# Patient Record
Sex: MALE | Race: WHITE
[De-identification: names, ages, dates, MRNs, and addresses within clinical notes are randomized per-mention and may not be internally consistent; named-entity substitution may affect disease eponyms.]

---

## 2020-05-29 ENCOUNTER — HOSPITAL ENCOUNTER (EMERGENCY)
Dept: HOSPITAL 53 - M ED | Age: 80
Discharge: ANOTHER HOSPITAL | End: 2020-05-29
Payer: MEDICARE

## 2020-05-29 VITALS — SYSTOLIC BLOOD PRESSURE: 145 MMHG | DIASTOLIC BLOOD PRESSURE: 87 MMHG

## 2020-05-29 VITALS — BODY MASS INDEX: 23.46 KG/M2 | HEIGHT: 67 IN | WEIGHT: 149.47 LBS

## 2020-05-29 DIAGNOSIS — I48.91: ICD-10-CM

## 2020-05-29 DIAGNOSIS — Z79.890: ICD-10-CM

## 2020-05-29 DIAGNOSIS — F17.210: ICD-10-CM

## 2020-05-29 DIAGNOSIS — Z79.01: ICD-10-CM

## 2020-05-29 DIAGNOSIS — Z79.899: ICD-10-CM

## 2020-05-29 DIAGNOSIS — I10: ICD-10-CM

## 2020-05-29 DIAGNOSIS — E07.9: ICD-10-CM

## 2020-05-29 DIAGNOSIS — I63.9: Primary | ICD-10-CM

## 2020-05-29 LAB
APTT BLD: 31.5 SECONDS (ref 25–38.4)
BASOPHILS # BLD AUTO: 0.1 10^3/UL (ref 0–0.2)
BASOPHILS NFR BLD AUTO: 0.9 % (ref 0–1)
CK MB CFR.DF SERPL CALC: 2.38
CK MB SERPL-MCNC: 3.4 NG/ML (ref ?–3.6)
CK SERPL-CCNC: 143 U/L (ref 39–308)
EOSINOPHIL # BLD AUTO: 0.1 10^3/UL (ref 0–0.5)
EOSINOPHIL NFR BLD AUTO: 2.1 % (ref 0–3)
HCT VFR BLD AUTO: 40.3 % (ref 42–52)
HGB BLD-MCNC: 13.5 G/DL (ref 13.5–17.5)
INR PPP: 1.11
LYMPHOCYTES # BLD AUTO: 0.8 10^3/UL (ref 1.5–5)
LYMPHOCYTES NFR BLD AUTO: 14 % (ref 24–44)
MCH RBC QN AUTO: 32.7 PG (ref 27–33)
MCHC RBC AUTO-ENTMCNC: 33.5 G/DL (ref 32–36.5)
MCV RBC AUTO: 97.6 FL (ref 80–96)
MONOCYTES # BLD AUTO: 0.5 10^3/UL (ref 0–0.8)
MONOCYTES NFR BLD AUTO: 8 % (ref 0–5)
NEUTROPHILS # BLD AUTO: 4.2 10^3/UL (ref 1.5–8.5)
NEUTROPHILS NFR BLD AUTO: 74.5 % (ref 36–66)
PLATELET # BLD AUTO: 142 10^3/UL (ref 150–450)
PROTHROMBIN TIME: 14 SECONDS (ref 11.8–14)
RBC # BLD AUTO: 4.13 10^6/UL (ref 4.3–6.1)
TROPONIN I SERPL-MCNC: < 0.02 NG/ML (ref ?–0.1)
WBC # BLD AUTO: 5.6 10^3/UL (ref 4–10)

## 2020-05-29 PROCEDURE — 70498 CT ANGIOGRAPHY NECK: CPT

## 2020-05-29 PROCEDURE — 85730 THROMBOPLASTIN TIME PARTIAL: CPT

## 2020-05-29 PROCEDURE — 94760 N-INVAS EAR/PLS OXIMETRY 1: CPT

## 2020-05-29 PROCEDURE — 93041 RHYTHM ECG TRACING: CPT

## 2020-05-29 PROCEDURE — 86850 RBC ANTIBODY SCREEN: CPT

## 2020-05-29 PROCEDURE — 84484 ASSAY OF TROPONIN QUANT: CPT

## 2020-05-29 PROCEDURE — 85610 PROTHROMBIN TIME: CPT

## 2020-05-29 PROCEDURE — 96365 THER/PROPH/DIAG IV INF INIT: CPT

## 2020-05-29 PROCEDURE — 80047 BASIC METABLC PNL IONIZED CA: CPT

## 2020-05-29 PROCEDURE — 93005 ELECTROCARDIOGRAM TRACING: CPT

## 2020-05-29 PROCEDURE — 82553 CREATINE MB FRACTION: CPT

## 2020-05-29 PROCEDURE — 82550 ASSAY OF CK (CPK): CPT

## 2020-05-29 PROCEDURE — 86901 BLOOD TYPING SEROLOGIC RH(D): CPT

## 2020-05-29 PROCEDURE — 70450 CT HEAD/BRAIN W/O DYE: CPT

## 2020-05-29 PROCEDURE — 86900 BLOOD TYPING SEROLOGIC ABO: CPT

## 2020-05-29 PROCEDURE — 71045 X-RAY EXAM CHEST 1 VIEW: CPT

## 2020-05-29 PROCEDURE — 70496 CT ANGIOGRAPHY HEAD: CPT

## 2020-05-29 PROCEDURE — 85025 COMPLETE CBC W/AUTO DIFF WBC: CPT

## 2020-05-29 PROCEDURE — 36415 COLL VENOUS BLD VENIPUNCTURE: CPT

## 2020-05-29 PROCEDURE — 99291 CRITICAL CARE FIRST HOUR: CPT

## 2020-05-30 NOTE — REP
NONCONTRAST BRAIN CT.

 

HISTORY:  CVA.

 

No comparison study.

 

FINDINGS:  Digital preliminary  radiograph is unremarkable.  There is some

vascular calcification in the distal internal carotid arteries bilaterally.  The

visualized paranasal sinuses are clear.  Bony calvarium appears intact.

 

On soft tissue window settings, there is minimal generalized volume loss.  There

is no evidence of intracranial hemorrhage or acute infarction.  No extra-axial

fluid collection is seen.  No mass or midline shift is observed.

 

IMPRESSION:

 

Generalized volume loss and vascular calcification.  No acute intracranial

abnormality.

 

 

Electronically Signed by

Cecil Maynard MD 05/31/2020 07:38 P

## 2020-05-30 NOTE — REP
CT ANGIOGRAPHY OF THE BRAIN WITH IV CONTRAST:

 

HISTORY:  CVA.

 

100 mL of intravenous Isovue 370 is administered.

 

CT ANGIOGRAPHIC TECHNIQUE:  Helical scanning is acquired and 2 mm axial images

are reformatted.  Coronal and sagittal MIP and MPR images are generated and

reviewed.  3D surface rendered color images of the arteries are generated as

well.

 

CT ANGIOGRAPHIC FINDINGS:  Distal vertebral arteries are patent and codominant.

Basilar artery is widely patent.  Posterior cerebral and superior cerebellar

vessels are unremarkable.  The distal internal carotid arteries contain some

calcification but no significant stenosis is seen.  Anterior and middle cerebral

arteries are unremarkable bilaterally.  No vessel cutoff is seen.  There is no CT

angiographic evidence of berry aneurysm or arteriovenous malformation.  The

sagittal and bilateral sigmoid and straight sinuses are patent and unremarkable.

 

IMPRESSION:

 

There is some vascular calcification in the carotid siphons bilaterally but no

high-grade stenosis is seen.  Otherwise negative CT angiography of the brain.

 

 

Electronically Signed by

Cecil Maynard MD 05/31/2020 07:40 P

## 2020-05-30 NOTE — REP
CHEST, SINGLE VIEW:

 

Single view of the chest is performed. Cardiac silhouette is slightly prominent,

unchanged since 10/03/2014. There is no change in the mediastinal silhouette.

Multiple sternal wires are again present. No acute infiltrate is seen in either

lung.

 

IMPRESSION:

 

No acute pulmonary disease.

 

 

Electronically Signed by

Augustin Mercer MD 05/30/2020 09:32 P

## 2020-05-30 NOTE — REP
CT ANGIOGRAPHY OF THE NECK WITH IV CONTRAST:

 

HISTORY:  Weakness.  CVA.

 

CT CONTRAST DOSE:  100 mL of intravenous Isovue 370.

 

CT ANGIOGRAPHIC TECHNIQUE:  Helical scanning is acquired and 2 mm axial images

are reformatted.  MIP and MPR images are generated and reviewed.

 

CT ANGIOGRAPHIC FINDINGS:  The thoracic aortic arch is unremarkable.  There is

calcific plaquing at the origin of the left common carotid artery from the arch,

and considerable plaquing is seen at the origin of the right common carotid

artery from the innominate.  There is also considerable plaquing at the origin of

the left vertebral artery from the left subclavian artery.  I cannot exclude some

degree of stenosis of the origin of the left vertebral and right common carotid

arteries.  The vertebral arteries are otherwise patent and codominant.  The

common carotid arteries are otherwise unremarkable.

 

On the left, there is minimal soft plaquing at the bifurcation.  Less than 50%

narrowing is seen in the origin of the left internal carotid artery.  The right

internal carotid artery is tortuous.  No significant plaquing or stenosis is seen

at its origin.

 

IMPRESSION:

 

Significant calcific plaquing is seen at the origin of the right common carotid

artery from the innominate and at the origin of the left vertebral artery from

the left subclavian.  Mild soft plaquing is seen at the carotid bifurcation on

the left but no high-grade internal carotid artery stenosis is seen.

 

 

Electronically Signed by

Cecil Maynard MD 05/31/2020 07:40 P

## 2020-05-30 NOTE — ECGEPIP
[facility] - ED

                                       

                                       Test Date:    2020

Pat Name:     ARMANDO ORTIZ          Department:   

Patient ID:   [id number]                 Room:         -

Gender:       Male                     Technician:   ef

:          1940               Requested By: DILIP REYNOSO

Order Number: [id number]     Reading MD:   Dilip Jordan

                                 Measurements

Intervals                              Axis          

Rate:         82                       P:            

NE:           0                        QRS:          42

QRSD:         106                      T:            83

QT:           399                                    

QTc:          466                                    

                           Interpretive Statements

Atrial fibrillation

LOW QRS VOLTAGE IN EXTREMITY LEADS

Nonspecific T wave abnormality

ANTEROLATERAL MYOCARDIAL INFARCTION, OF INDETERMINATE AGE

Comparison tracing not on file

Electronically Signed on 2020 0:15:44 EDT by Dilip Jordan

## 2020-06-05 ENCOUNTER — HOSPITAL ENCOUNTER (OUTPATIENT)
Dept: HOSPITAL 53 - M SFHCPLAZ | Age: 80
End: 2020-06-05
Attending: INTERNAL MEDICINE
Payer: MEDICARE

## 2020-06-05 DIAGNOSIS — E03.9: Primary | ICD-10-CM

## 2020-06-05 LAB
T4 FREE SERPL-MCNC: 1.31 NG/DL (ref 0.76–1.46)
TSH SERPL DL<=0.005 MIU/L-ACNC: 6.78 UIU/ML (ref 0.36–3.74)

## 2020-06-05 PROCEDURE — 36415 COLL VENOUS BLD VENIPUNCTURE: CPT

## 2020-06-05 PROCEDURE — 84443 ASSAY THYROID STIM HORMONE: CPT

## 2020-06-05 PROCEDURE — 84439 ASSAY OF FREE THYROXINE: CPT

## 2020-06-10 ENCOUNTER — HOSPITAL ENCOUNTER (OUTPATIENT)
Dept: HOSPITAL 53 - M RAD | Age: 80
End: 2020-06-10
Attending: STUDENT IN AN ORGANIZED HEALTH CARE EDUCATION/TRAINING PROGRAM
Payer: MEDICARE

## 2020-06-10 DIAGNOSIS — I63.9: Primary | ICD-10-CM

## 2020-06-10 NOTE — REP
CT brain:  06/10/2020.

 

Indication:  Stroke.

 

Technique:  Unenhanced axial CT images of the brain were obtained from skull base

to vertex with coronal reconstructions provided.

 

Comparison:  05/29/2020.

 

Findings:

 

There is a more conspicuous left frontal / MCA infarction without hemorrhage or

mass effect.  No acute infarction is detected.  There is no hydrocephalus.

 

Impression:

 

Involving the left MCA infarction without hemorrhage or mass effect.

 

 

Electronically Signed by

Octaviano Amor DO 06/10/2020 11:29 A

## 2020-06-12 ENCOUNTER — HOSPITAL ENCOUNTER (INPATIENT)
Dept: HOSPITAL 53 - M ED | Age: 80
LOS: 1 days | Discharge: HOME | DRG: 69 | End: 2020-06-13
Attending: INTERNAL MEDICINE | Admitting: INTERNAL MEDICINE
Payer: MEDICARE

## 2020-06-12 VITALS — DIASTOLIC BLOOD PRESSURE: 61 MMHG | SYSTOLIC BLOOD PRESSURE: 118 MMHG

## 2020-06-12 VITALS — BODY MASS INDEX: 26.74 KG/M2 | HEIGHT: 67 IN | WEIGHT: 170.35 LBS

## 2020-06-12 VITALS — DIASTOLIC BLOOD PRESSURE: 62 MMHG | SYSTOLIC BLOOD PRESSURE: 118 MMHG

## 2020-06-12 VITALS — DIASTOLIC BLOOD PRESSURE: 63 MMHG | SYSTOLIC BLOOD PRESSURE: 109 MMHG

## 2020-06-12 VITALS — SYSTOLIC BLOOD PRESSURE: 150 MMHG | DIASTOLIC BLOOD PRESSURE: 72 MMHG

## 2020-06-12 DIAGNOSIS — I48.91: ICD-10-CM

## 2020-06-12 DIAGNOSIS — G45.9: Primary | ICD-10-CM

## 2020-06-12 DIAGNOSIS — Z79.899: ICD-10-CM

## 2020-06-12 DIAGNOSIS — I25.10: ICD-10-CM

## 2020-06-12 DIAGNOSIS — E03.9: ICD-10-CM

## 2020-06-12 LAB
ALBUMIN SERPL BCG-MCNC: 3.9 GM/DL (ref 3.2–5.2)
ALT SERPL W P-5'-P-CCNC: 37 U/L (ref 12–78)
APTT BLD: 32 SECONDS (ref 25–38.4)
BASOPHILS # BLD AUTO: 0.1 10^3/UL (ref 0–0.2)
BASOPHILS NFR BLD AUTO: 0.9 % (ref 0–1)
BILIRUB CONJ SERPL-MCNC: 0.3 MG/DL (ref 0–0.2)
BILIRUB SERPL-MCNC: 0.9 MG/DL (ref 0.2–1)
BUN SERPL-MCNC: 22 MG/DL (ref 7–18)
CALCIUM SERPL-MCNC: 9.3 MG/DL (ref 8.8–10.2)
CHLORIDE SERPL-SCNC: 102 MEQ/L (ref 98–107)
CHOLEST SERPL-MCNC: 90 MG/DL (ref ?–200)
CHOLEST/HDLC SERPL: 2.14 {RATIO} (ref ?–5)
CK MB CFR.DF SERPL CALC: 2.24
CK MB SERPL-MCNC: 2.4 NG/ML (ref ?–3.6)
CK SERPL-CCNC: 107 U/L (ref 39–308)
CO2 SERPL-SCNC: 26 MEQ/L (ref 21–32)
CREAT SERPL-MCNC: 1.17 MG/DL (ref 0.7–1.3)
EOSINOPHIL # BLD AUTO: 0.1 10^3/UL (ref 0–0.5)
EOSINOPHIL NFR BLD AUTO: 0.8 % (ref 0–3)
EST. AVERAGE GLUCOSE BLD GHB EST-MCNC: 108 MG/DL (ref 60–110)
GFR SERPL CREATININE-BSD FRML MDRD: > 60 ML/MIN/{1.73_M2} (ref 35–?)
GLUCOSE SERPL-MCNC: 117 MG/DL (ref 70–100)
HCT VFR BLD AUTO: 41.6 % (ref 42–52)
HDLC SERPL-MCNC: 42 MG/DL (ref 40–?)
HGB BLD-MCNC: 13.7 G/DL (ref 13.5–17.5)
INR PPP: 1.23
LDLC SERPL CALC-MCNC: 37 MG/DL (ref ?–100)
LYMPHOCYTES # BLD AUTO: 0.8 10^3/UL (ref 1.5–5)
LYMPHOCYTES NFR BLD AUTO: 9.8 % (ref 24–44)
MCH RBC QN AUTO: 33 PG (ref 27–33)
MCHC RBC AUTO-ENTMCNC: 32.9 G/DL (ref 32–36.5)
MCV RBC AUTO: 100.2 FL (ref 80–96)
MONOCYTES # BLD AUTO: 0.5 10^3/UL (ref 0–0.8)
MONOCYTES NFR BLD AUTO: 6.3 % (ref 0–5)
NEUTROPHILS # BLD AUTO: 6.3 10^3/UL (ref 1.5–8.5)
NEUTROPHILS NFR BLD AUTO: 80.7 % (ref 36–66)
NONHDLC SERPL-MCNC: 48 MG/DL
PLATELET # BLD AUTO: 174 10^3/UL (ref 150–450)
POTASSIUM SERPL-SCNC: 3.8 MEQ/L (ref 3.5–5.1)
PROT SERPL-MCNC: 7 GM/DL (ref 6.4–8.2)
PROTHROMBIN TIME: 15.2 SECONDS (ref 11.8–14)
RBC # BLD AUTO: 4.15 10^6/UL (ref 4.3–6.1)
SODIUM SERPL-SCNC: 135 MEQ/L (ref 136–145)
TRIGL SERPL-MCNC: 55 MG/DL (ref ?–150)
TROPONIN I SERPL-MCNC: < 0.02 NG/ML (ref ?–0.1)
TSH SERPL DL<=0.005 MIU/L-ACNC: 3.66 UIU/ML (ref 0.36–3.74)
WBC # BLD AUTO: 7.8 10^3/UL (ref 4–10)

## 2020-06-12 RX ADMIN — APIXABAN SCH MG: 2.5 TABLET, FILM COATED ORAL at 20:28

## 2020-06-12 NOTE — REPVR
PROCEDURE INFORMATION: 

Exam: MR Angiogram Head Without Contrast, Arteries 

Exam date and time: 6/12/2020 6:30 PM 

Age: 80 years old 

Clinical indication: Condition or disease; Infarction; Additional info: CVA 



TECHNIQUE: 

Imaging protocol: MR angiogram head without contrast. Exam focused on the 

arteries. 

3D rendering: MIP and/or 3D reconstructed images were created by the 

technologist. 



COMPARISON: 

CT ANGIO HEAD 6/12/2020 1:35 PM 



FINDINGS: 

Anterior cerebral arteries: Intracranial segment is patent with no significant 

stenosis. No aneurysm. 

Right internal carotid artery: Intracranial segment is patent with no 

significant stenosis. No aneurysm. 

Right middle cerebral artery: No occlusion or significant stenosis. No 

aneurysm. 

Right posterior cerebral artery: No occlusion or significant stenosis. No 

aneurysm. 

Right vertebral artery: No occlusion or significant stenosis. No aneurysm. 



Left internal carotid artery: Intracranial segment is patent with no 

significant stenosis. No aneurysm. 

Left middle cerebral artery: No occlusion or significant stenosis. No aneurysm. 

Left posterior cerebral artery: No occlusion or significant stenosis. No 

aneurysm. 

Left vertebral artery: No occlusion or significant stenosis. No aneurysm. 



Basilar artery: No occlusion or significant stenosis. No aneurysm. 



IMPRESSION: 

No hemodynamically significant stenosis or large vessel occlusion. 



Electronically signed by: Khadar Martinez On 06/12/2020  18:56:30 PM

## 2020-06-12 NOTE — ECGEPIP
[facility] - ED

                                       

                                       Test Date:    2020

Pat Name:     ARMANDO ORTIZ          Department:   

Patient ID:   [id number]                 Room:         -

Gender:       Male                     Technician:   justin

:          1940               Requested By: MARY DUBOIS

Order Number: [id number]     Reading MD:   Dilip Jordan

                                 Measurements

Intervals                              Axis          

Rate:         69                       P:            

MD:           0                        QRS:          40

QRSD:         102                      T:            87

QT:           421                                    

QTc:          454                                    

                           Interpretive Statements

ATRIAL FIBRILLATION

LOW QRS VOLTAGE throughout

ANTEROLATERAL MYOCARDIAL INFARCTION, OF INDETERMINATE AGE

Nonspecific T wave abnormality

Similar to tracing done20

Electronically Signed on 2020 16:04:47 EDT by Dilip Jordan

## 2020-06-12 NOTE — REP
Intra and extracranial CTA:  06/12/2020.

 

Indication:  Stroke.

 

Technique:  High resolution axial CT images of the intra and extracranial

circulations were performed following IV iodinated contrast with coronal,

sagittal and 3-D reconstructions provided.

 

Comparison:  05/29/2020.

 

Findings:

 

There is no intracranial high-grade stenosis, aneurysm, AVM or vessel occlusion.

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

 

There is no hemodynamically significant extracranial ICA stenosis by NASCET

criteria.  There is mild atherosclerotic narrowing of the distal right common

carotid artery.  There is high-grade atherosclerotic narrowing of the left

vertebral artery origin.  The recently reported stenosis of the proximal right

common carotid artery is incompletely evaluated secondary to artifact from

contrast injection.

 

Impression:

 

No intracranial high-grade stenosis or occlusion.

 

No hemodynamically significant extracranial ICA stenosis.

 

Significant atherosclerotic stenosis of the left vertebral origin.

 

 

Electronically Signed by

Octaviano Amor DO 06/12/2020 02:28 P

## 2020-06-12 NOTE — REPVR
PROCEDURE INFORMATION: 

Exam: MR Head Without Contrast 

Exam date and time: 6/12/2020 6:30 PM 

Age: 80 years old 

Clinical indication: Condition or disease; Other: CVA 



TECHNIQUE: 

Imaging protocol: MR of the head without contrast. 



COMPARISON: 

CT Head without contrast 6/12/2020 1:35 PM 



FINDINGS: 

 Major vascular flow voids at the skull base are preserved. No extra-axial 

fluid collection. Age-related volume loss. 

 There is left frontal lobe increased diffusion-weighted signal corresponding 

to hypodensity visualized on recent CT examination. There is associated T2 

prolongation and edema. There is irregular hemorrhagic component measuring up 

to 2 centimetres. No midline shift. 

 Visualized paranasal sinuses are clear. Minimal fluid signal involving the 

left mastoid air cells. 



IMPRESSION: 

Subacute left MCA infarction with hemorrhagic component measuring up to 2 

centimetres. 



Electronically signed by: Khadar Martinez On 06/12/2020  18:57:32 PM

## 2020-06-12 NOTE — REP
CT brain:  06/12/2020.

 

Indication:  Stroke.

 

Technique:  Unenhanced axial CT images of the brain were obtained from skull base

to vertex with coronal reconstructions provided.

 

Comparison:  06/10/2020.

 

Findings:

 

The left frontal lobe / MCA infarction continues to evolve without worsening mass

effect or hemorrhage.  No new infarctions are present.  Volume loss is present.

There is no hydrocephalus.

 

Impression:

 

Evolving the left frontal lobe / MCA infarction without hemorrhagic

transformation or significant mass effect.

 

 

Electronically Signed by

Octaviano Amor DO 06/12/2020 02:11 P

## 2020-06-12 NOTE — REP
CHEST, SINGLE VIEW:

 

Single view of the chest is performed.

 

There is no acute infiltrate. Heart is normal in size, and the mediastinal

silhouette is unremarkable. Multiple sternal wires and mediastinal clips are

present. There are old left rib fractures.

 

IMPRESSION:

No acute pulmonary disease.

 

 

 

Unreviewed

## 2020-06-12 NOTE — HPEPDOC
[facility]


Date of Admission


Jun 12, 2020


Date of Service:  Jun 12, 2020


Attending Physician:  LISA MATHIS MD





History and Physical


TIME OF SERVICE: 3:50 PM


   


CHIEF COMPLAINT: arm numbness





HISTORY OF PRESENT ILLNESS: 


The majority of the history was obtained from . 


This is an 80-year-old gentleman who presented to [facility] on May 29 and was 

diagnosed with a CVA. He received TPA and was sent to [facility] where he was 

diagnosed with a distal M2 occlusion. He didn't receive thrombectomy. While at 

70. He was diagnosed with atrial fibrillation. MRI done at [facility] showed some 

hemorrhagic transformation, therefore, he wasn't started on anticoagulation. 

This morning at around 10:30 the patient developed transient right lower arm in,

numbness and weakness that has now resolved. He denies dropping any objects or 

falling. He denies having any difficulty swallowing, any palpitations, any 

dizziness, or any chest pain. Today CT of the head showed evolving left frontal 

lobe MCA without hemorrhagic transformation or mass effect. EKG showed heart 

rate of 69 with atrial fibrillation. Dr. Tom he discuss his findings with Dr. Fallon who recommended starting Elquis 2.5 mg twice a day & repeat MRI of the 

brain.





REVIEW OF SYSTEMS: 12 point review of systems negative except as listed in HPI





PAST MEDICAL/ SURGICAL HISTORY:


Newly diagnosed atrial fibrillation


Newly diagnosed left middle cerebral artery CVA


Hypothyroidism


Coronary artery disease / status post CABG





SOCIAL HISTORY:


He is a former smoker





FAMILY HISTORY:


Rheumatic heart disease


   


ALLERGIES: Please see below.





HOME MEDICATIONS: Please see below. 





PHYSICAL EXAMINATION:


Vital Signs








  Date Time  Temp Pulse Resp B/P (MAP) Pulse Ox O2 Delivery O2 Flow Rate FiO2


 


6/12/20 13:03 97.5 71 24 151/67 (95) 100 Room Air  





GEN: well-nourished / well developed/ NAD


INTEGUMENT: not flushed/ not jaundice 


HEENT: NCAT /mucus membranes moist and pink 


CVS: RRR/NMRG / no lower extremity edema


LUNGS:  lungs are clear to auscultation bilaterally on room air


MSK/EXTREMITIES: range of motion intact in all 4 extremities 


NEURO: CN 2-12 are grossly intact / speech is not dysarthric , but he seems to 

have word finding difficulties 


PSYCH: alert and oriented / able to understand and follow all commands





LABORATORY DATA: 





Immature Granulocyte % (Auto) 1.5, Neutrophils (%) (Auto) 80.7H, Lymphocytes (%)

(Auto) 9.8L, Monocytes (%) (Auto) 6.3H, Eosinophils (%) (Auto) 0.8, Basophils 

(%) (Auto) 0.9, Neutrophils # (Auto) 6.3, Lymphocytes # (Auto) 0.8L, Monocytes #

(Auto) 0.5, Eosinophils # (Auto) 0.1, Basophils # (Auto) 0.1, Nucleated Red 

Blood Cells % (auto) 0.0, Prothrombin Time 15.2H, Prothromb Time International 

Ratio 1.23, Activated Partial Thromboplast Time 32.0, POC Troponin I (Misc) 

0.01, POC Prothrombin Time (Misc) 14.8H, POC INR (Misc) 1.2, Anion Gap 7L, 

Glomerular Filtration Rate > 60.0, Calcium Level 9.3, Total Bilirubin 0.9, 

Direct Bilirubin 0.3H, Aspartate Amino Transf (AST/SGOT) 33, Alanine Am

inotransferase (ALT/SGPT) 37, Alkaline Phosphatase 66, Total Creatine Kinase 

107, Creatine Kinase MB 2.4, Creatine Kinase MB Relative Index 2.24, Troponin I 

< 0.02, Total Protein 7.0, Albumin 3.9, Albumin/Globulin Ratio 1.3


6/12/20 13:28: 


POC Glucose (Misc Panel) 122H, POC Sodium (Misc Panel) 135L, POC Potassium (Misc

Panel) 3.7, POC Chloride (Misc Panel) 98, POC Total CO2 (Misc Panel) 24.0, POC 

Blood Urea Nitrogen (Misc Panel 22, POC Ionized Calcium (Misc Panel) 5.1, POC 

Creatinine (Misc Panel) 1.2, POC Hematocrit (Misc Panel) 43.0





IMAGING: 


Chest x-ray " IMPRESSION: No acute pulmonary disease"


CT head " Impression: Evolving the left frontal lobe / MCA infarction without 

hemorrhagic transformation or significant mass effect."


CTA neck " Impression: No intracranial high-grade stenosis or occlusion. No 

hemodynamically significant extracranial ICA stenosis. Significant 

atherosclerotic stenosis of the left vertebral origin."


CTA head " Impression: No intracranial high-grade stenosis or occlusion. No 

hemodynamically significant extracranial ICA stenosis. Significant 

atherosclerotic stenosis of the left vertebral origin."


MRI brain " IMPRESSION: Subacute left MCA infarction with hemorrhagic component 

measuring up to 2 


centimetres. "


MRA brain " IMPRESSION: No hemodynamically significant stenosis or large vessel 

occlusion. "





MICROBIOLOGY: Please see below. 





ASSESSMENT: 


Mr. Ulrich is an 80-year-old with a history of atrial fibrillation, recent 

left middle cerebral artery CVA, hypothyroidism, and CAD who is admitted for 

evaluation of transient right upper extremity paresthesia and weakness, possibly

due to TIA.





PLAN:


1. TIA


Transient paresthesia and weakness was likely due to TIA


ABCD2 Score for risk of CVA after TIA 4 to 5


Results from MRI. Discussed with Dr. Fallon by phone who confirmed the decision to 

give eliquis


Plan: admit to medical floor / telemetry /fall precautions / f/u lipid panel for

ACVD risk score, THS & A1C / request Echo report from [facility] [[email or web address]]  / swallow 

evaluation / PT/OT/SPL  / since his ABCD2 Score >/=4 in addition to eliquis 

2.5mg BID we offered the patient ASA but he declined because it makes him feel 

odd / statin / 





2. Atrial Fibrillation 


Diagnosed at [facility]


Plan:  telemetry / f/u records from [facility] including echo / eliquis





3. Chronic CAD - atenolol, statin / declined ASA


4. Hypothyrodism- levothyroxine


DVT PROPHYLAXIS: n/a on AC


DISPOSITION: possibly home tomorrow





Home Medications


Scheduled


Atenolol (Atenolol) 25 Mg Tablet, 25 MG PO BID


Atorvastatin Calcium (Lipitor) 10 Mg Tablet, 5 MG PO DAILY


Levothyroxine Sodium (Synthroid) 50 Mcg Tablet, 50 MCG PO DAILY





Allergies


Coded Allergies:  


     No Known Allergies (Unverified , 11/15/13)





A-FIB/CHADSVASC


A-FIB History


Current/History of A-Fib/PAF?:  Yes


Current PO Anticoag Therapy:  Yes











LISA MATHIS MD                Jun 12, 2020 15:28

## 2020-06-13 VITALS — SYSTOLIC BLOOD PRESSURE: 138 MMHG | DIASTOLIC BLOOD PRESSURE: 70 MMHG

## 2020-06-13 VITALS — SYSTOLIC BLOOD PRESSURE: 126 MMHG | DIASTOLIC BLOOD PRESSURE: 68 MMHG

## 2020-06-13 VITALS — DIASTOLIC BLOOD PRESSURE: 65 MMHG | SYSTOLIC BLOOD PRESSURE: 124 MMHG

## 2020-06-13 LAB
BUN SERPL-MCNC: 17 MG/DL (ref 7–18)
CALCIUM SERPL-MCNC: 8.7 MG/DL (ref 8.8–10.2)
CHLORIDE SERPL-SCNC: 110 MEQ/L (ref 98–107)
CO2 SERPL-SCNC: 24 MEQ/L (ref 21–32)
CREAT SERPL-MCNC: 0.99 MG/DL (ref 0.7–1.3)
GFR SERPL CREATININE-BSD FRML MDRD: > 60 ML/MIN/{1.73_M2} (ref 35–?)
GLUCOSE SERPL-MCNC: 86 MG/DL (ref 70–100)
HCT VFR BLD AUTO: 36.8 % (ref 42–52)
HGB BLD-MCNC: 12.4 G/DL (ref 13.5–17.5)
MCH RBC QN AUTO: 33.2 PG (ref 27–33)
MCHC RBC AUTO-ENTMCNC: 33.7 G/DL (ref 32–36.5)
MCV RBC AUTO: 98.7 FL (ref 80–96)
PLATELET # BLD AUTO: 163 10^3/UL (ref 150–450)
POTASSIUM SERPL-SCNC: 3.9 MEQ/L (ref 3.5–5.1)
RBC # BLD AUTO: 3.73 10^6/UL (ref 4.3–6.1)
SODIUM SERPL-SCNC: 140 MEQ/L (ref 136–145)
WBC # BLD AUTO: 5.9 10^3/UL (ref 4–10)

## 2020-06-13 RX ADMIN — APIXABAN SCH MG: 2.5 TABLET, FILM COATED ORAL at 08:53

## 2020-06-13 NOTE — DS.PDOC
Discharge Summary


General


Date of Admission


Jun 12, 2020 at 15:21


Date of Discharge


06/13/20





Discharge Summary


PROCEDURES PERFORMED DURING STAY: None.





ADMITTING DIAGNOSES: 


1. Atrial fibrillation, CVA.





DISCHARGE DIAGNOSES:


1. Atrial fibrillation, CVA.





COMPLICATIONS/CHIEF COMPLAINT: Atrial Fibrillation,Transient Ischemic Attack.





HISTORY OF PRESENT ILLNESS: The majority of the history was obtained from 

. 


This is an 80-year-old gentleman who presented to [facility] on May 29 and was 

diagnosed with a CVA. He received TPA and was sent to [facility] where he was 

diagnosed with a distal M2 occlusion. He didn't receive thrombectomy. While at 

70. He was diagnosed with atrial fibrillation. MRI done at [facility] showed some 

hemorrhagic transformation, therefore, he wasn't started on anticoagulation. 

This morning at around 10:30 the patient developed transient right lower arm in,

numbness and weakness that has now resolved. He denies dropping any objects or 

falling. He denies having any difficulty swallowing, any palpitations, any 

dizziness, or any chest pain. Today CT of the head showed evolving left frontal 

lobe MCA without hemorrhagic transformation or mass effect. EKG showed heart 

rate of 69 with atrial fibrillation. Dr. Tom he discuss his findings with Dr. Fallon who recommended starting Elquis 2.5 mg twice a day & repeat MRI of the 

brain..





HOSPITAL COURSE: Patient was admitted with possible new TIA. All her workup 

including MRI of the brain, CT of the neck and brain were essentially within 

normal range. His repeat MRI of the brain shows subacute left MCA infarct with 

hemorrhagic component. Patient neuro exam was essentially negative. I called Dr. Fallon and discussed case with him. Reviewed all the laboratory and radiological 

data with him and he advised to continue and liquids 2.5 mg by mouth twice a 

day, and the subacute infarct. MRI of most likely is the old CVA which patient 

sustained a few days ago and was taken quite some time, did not show up on her 

radiological workup. Patient also was started on baby aspirin but he refuses to 

take aspirin. All risks   were explained to him, but he preferres to talk to his

neurologist before he takes aspirin but he is willing to take and eliquis as per

neuro recommendations.


Blossom will be discharged home on by mouth anticoagulation statins and aspirin 

and all his other home meds. 





DISCHARGE MEDICATIONS: Please see below.


 


ALLERGIES: Please see below.





PHYSICAL EXAMINATION ON DISCHARGE:


VITAL SIGNS: Please see below.


GENERAL: Within normal limits


HEENT: PERRLA. Extraocular muscles intact


NECK: Supple


CARDIOVASCULAR EXAMINATION: S1, S2, regular


RESPIRATORY EXAMINATION: Clear to A&P


ABDOMINAL EXAMINATION: Benign


EXTREMITIES: No clubbing, cyanosis, edema


SKIN: Normal


NEUROLOGICAL EXAMINATION: . No focal motor sensory or cranial nerve deficit 


PSYCHIATRIC EXAMINATION: Normal





LABORATORY DATA: Please see below.





IMAGING: MRI of the brain:IMPRESSION: 


Subacute left MCA infarction with hemorrhagic component measuring up to 2 


centimetres. 





PROGNOSIS: Good





ACTIVITY: As tolerated.





DIET: As tolerated





DISCHARGE PLAN: Home





DISPOSITION: 01 Home, Self-Care.





DISCHARGE INSTRUCTIONS:


1. As per discharge instructions.





ITEMS TO FOLLOWUP ON ON OUTPATIENT:


1. Follow with Dr. Fallon neurology in 1-2 weeks.





DISCHARGE CONDITION: Stable.





TIME SPENT ON DISCHARGE: 25 minutes.





Vital Signs/I&Os





Vital Signs








  Date Time  Temp Pulse Resp B/P (MAP) Pulse Ox O2 Delivery O2 Flow Rate FiO2


 


6/13/20 08:55  65  138/70    


 


6/13/20 06:00 98.1  18  97 Room Air  














I&O- Last 24 Hours up to 6 AM 


 


 6/13/20





 06:00


 


Intake Total 470 ml


 


Output Total 650 ml


 


Balance -180 ml











Laboratory Data


Labs 24H


Laboratory Tests 2


6/12/20 13:24: 


Immature Granulocyte % (Auto) 1.5, Neutrophils (%) (Auto) 80.7H, Lymphocytes (%)

(Auto) 9.8L, Monocytes (%) (Auto) 6.3H, Eosinophils (%) (Auto) 0.8, Basophils 

(%) (Auto) 0.9, Neutrophils # (Auto) 6.3, Lymphocytes # (Auto) 0.8L, Monocytes #

(Auto) 0.5, Eosinophils # (Auto) 0.1, Basophils # (Auto) 0.1, Nucleated Red 

Blood Cells % (auto) 0.0, Prothrombin Time 15.2H, Prothromb Time International 

Ratio 1.23, Activated Partial Thromboplast Time 32.0, POC Troponin I (Misc) 

0.01, POC Prothrombin Time (Misc) 14.8H, POC INR (Misc) 1.2, Anion Gap 7L, 

Glomerular Filtration Rate > 60.0, Estimated Mean Plasma Glucose 108, Hemoglobin

A1c 5.4, Calcium Level 9.3, Total Bilirubin 0.9, Direct Bilirubin 0.3H, 

Aspartate Amino Transf (AST/SGOT) 33, Alanine Aminotransferase (ALT/SGPT) 37, 

Alkaline Phosphatase 66, Total Creatine Kinase 107, Creatine Kinase MB 2.4, 

Creatine Kinase MB Relative Index 2.24, Troponin I < 0.02, Total Protein 7.0, 

Albumin 3.9, Albumin/Globulin Ratio 1.3, Triglycerides Level 55, Total 

Cholesterol 90, LDL Cholesterol 37, Non-HDL Cholesterol (LDL + VLDL) 48, Total 

HDL Cholesterol 42, Cholesterol/HDL Ratio 2.142, Thyroid Stimulating Hormone 

(TSH) 3.660


6/12/20 13:28: 


POC Glucose (Misc Panel) 122H, POC Sodium (Misc Panel) 135L, POC Potassium (Misc

Panel) 3.7, POC Chloride (Misc Panel) 98, POC Total CO2 (Misc Panel) 24.0, POC 

Blood Urea Nitrogen (Misc Panel 22, POC Ionized Calcium (Misc Panel) 5.1, POC 

Creatinine (Misc Panel) 1.2, POC Hematocrit (Misc Panel) 43.0


6/13/20 06:23: 


Nucleated Red Blood Cells % (auto) 0.0, Anion Gap 6L, Glomerular Filtration Rate

> 60.0, Calcium Level 8.7L


CBC/BMP


Laboratory Tests


6/12/20 13:24








6/13/20 06:23











Discharge Medications


Scheduled


Apixaban (Eliquis) 2.5 Mg Tablet, 2.5 MG PO BID


Apixaban (Eliquis) 2.5 Mg Tablet, 2.5 MG PO BID


Aspirin (Children's Aspirin) 81 Mg Tab.chew, 81 MG PO DAILY


Atenolol (Atenolol) 25 Mg Tablet, 25 MG PO BID, (Reported)


Atorvastatin Calcium (Lipitor) 10 Mg Tablet, 5 MG PO DAILY, (Reported)


Levothyroxine Sodium (Synthroid) 50 Mcg Tablet, 50 MCG PO DAILY, (Reported)





Allergies


Coded Allergies:  


     No Known Allergies (Unverified , 11/15/13)











SCOTTIE HU MD              Jun 13, 2020 13:19

## 2021-03-03 ENCOUNTER — HOSPITAL ENCOUNTER (OUTPATIENT)
Dept: HOSPITAL 53 - M RAD | Age: 81
End: 2021-03-03
Attending: FAMILY MEDICINE
Payer: MEDICARE

## 2021-03-03 DIAGNOSIS — R18.8: ICD-10-CM

## 2021-03-03 DIAGNOSIS — N18.2: Primary | ICD-10-CM

## 2021-03-03 DIAGNOSIS — J90: ICD-10-CM

## 2021-03-03 NOTE — REP
INDICATION:

EVAL FOR RENAL ARTERY STENOSIS



COMPARISON:

None



TECHNIQUE:

Real time gray scale ultrasound examination using curved array transducer followed by

color Doppler evaluation of the renal vasculature.



FINDINGS:

Bilateral kidneys are normal in contour, size, echogenicity, and reniform shape.  No

hydronephrosis, nephrolithiasis, cystic or renal mass lesion appreciated right kidney

measures 9.0 x 5.3 x 4.6 cm.  Left kidney measures 9.8 x 4.8 x 5.8 cm.  Bladder is

under distended but grossly normal.  Prostate gland is heterogeneous and measures 4.0

x 3.6 x 3.2 cm (24 cc).



Incidental trace ascites and small right pleural effusion noted.



COLOR DOPPLER EVALUATION.



Peak aortic velocity: 69 centimeters/second



RIGHT KIDNEY

Renal arterial velocity: 103 centimeters/second

Renal-aortic ratio: 1.5

Intrarenal resistive indices: 0.72-0.80

Intrarenal acceleration times: 0.026-0.054



LEFT KIDNEY

Renal arterial velocity: 111 centimeters/second

Renal-aortic ratio: 1.6

Intrarenal resistive indices: 0.72-0.80

Intrarenal acceleration times: 0.034-0.040





IMPRESSION:

1. Kidneys appear normal.

2. Doppler interegation without sonographic evidence for renal arterial stenosis.

3. Incidental small right pleural effusion and minimal visualized ascites.





<Electronically signed by Agapito Castellon > 03/03/21 1045

## 2021-03-31 ENCOUNTER — HOSPITAL ENCOUNTER (OUTPATIENT)
Dept: HOSPITAL 53 - M RAD | Age: 81
End: 2021-03-31
Attending: INTERNAL MEDICINE
Payer: MEDICARE

## 2021-03-31 DIAGNOSIS — D69.6: Primary | ICD-10-CM

## 2021-03-31 NOTE — REP
INDICATION:

THROMBOCYTOPENIA.



COMPARISON:

Renal ultrasound 03/03/2021.



TECHNIQUE:

Scanning of the left upper quadrant in this patient with thrombocytopenia.



FINDINGS:

Left kidney is 10.1 x 5.5 x 4.5 cm. There is some increase central sinus fat but the

cortical thickness and echogenicity are maintained.  Some fetal lobation is evident.

Normal hypoechoic pyramids without echogenic foci suggest calcification within the

kidney.  Spleen is homogeneous measuring 8.7 x 3.9 x 8.6 cm.  This gives a calculated

splenic index of 291 with normal range up to 480.  No adjacent ascites.



IMPRESSION:

1. No evidence of splenomegaly or focal splenic lesion.  No ascites in the left upper

quadrant.

2. Left kidney normal.  No hydronephrosis, stone, mass or cyst.





<Electronically signed by Remberto Beck > 03/31/21 [id number]

## 2021-04-02 ENCOUNTER — HOSPITAL ENCOUNTER (OUTPATIENT)
Dept: HOSPITAL 53 - M LAB REF | Age: 81
End: 2021-04-02
Attending: INTERNAL MEDICINE
Payer: MEDICARE

## 2021-04-02 DIAGNOSIS — R80.9: Primary | ICD-10-CM

## 2021-04-02 LAB — PROT SERPL-MCNC: 7.1 GM/DL (ref 6.4–8.2)

## 2021-04-05 LAB
C-ANCA TITR SER IF: (no result) TITER
P-ANCA ATYPICAL TITR SER IF: (no result) TITER
P-ANCA TITR SER IF: (no result) TITER

## 2021-04-06 LAB
ALBUMIN MFR UR ELPH: 60.7 % (ref 55.8–66.1)
ALBUMIN SERPL-MCNC: 4.31 GM/DL (ref 3.29–5.55)
ALPHA1 GLOB MFR SERPL ELPH: 3.7 % (ref 2.9–4.9)
ALPHA1 GLOB SERPL ELPH-MCNC: 0.26 GM/DL (ref 0.17–0.41)
ALPHA2 GLOB SERPL ELPH-MCNC: 0.6 GM/DL (ref 0.42–0.99)
ALPHA2 GLOB SERPL ELPH-MCNC: 8.5 % (ref 7.1–11.8)
B-GLOBULIN SERPL ELPH-MCNC: 0.45 GM/DL (ref 0.28–0.6)
BETA1 GLOB MFR SERPL ELPH: 6.3 % (ref 4.7–7.2)
BETA2 GLOB MFR SERPL ELPH: 5 % (ref 3.2–6.5)
BETA2 GLOB SERPL ELPH-MCNC: 0.36 GM/DL (ref 0.19–0.55)
GAMMA GLOB 24H MFR UR ELPH: 15.8 % (ref 11.1–18.8)
GAMMA GLOB SERPL ELPH-MCNC: 1.12 GM/DL (ref 0.65–1.58)

## 2021-04-08 ENCOUNTER — HOSPITAL ENCOUNTER (OUTPATIENT)
Dept: HOSPITAL 53 - M RAD | Age: 81
End: 2021-04-08
Attending: FAMILY MEDICINE
Payer: MEDICARE

## 2021-04-08 DIAGNOSIS — J90: ICD-10-CM

## 2021-04-08 DIAGNOSIS — K83.9: Primary | ICD-10-CM

## 2021-04-08 NOTE — REP
INDICATION:

LARGE CBD, CA VS STONES ECT.



COMPARISON:

None.



TECHNIQUE:

Abdomen/pelvis CT without IV or bowel contrast.



FINDINGS:

There is a small right pleural effusion in the visualized lower lung fields.

The unenhanced hepatic parenchyma is homogeneous.

The gallbladder is collapsed and cannot be evaluated.  The intrapancreatic portion of

the common biliary duct is visualized and measures 9 mm in diameter.  This is slightly

dilated.  No intraluminal calcifications are identified.  The pancreatic duct is not

dilated.  The head and uncinate process of the pancreas do not appear dilated.  There

is no peripancreatic inflammation or fluid.  There are calcifications in relation of

the pancreas, likely atheromatous calcifications in the splenic artery.  The body and

tail of the pancreas are unremarkable.

Spleen is normal size and unremarkable.

Stomach is distended with ingested material.

No abdominal or pelvic ascites is identified.

The adrenals, kidneys and abdominal aorta are unremarkable except for calcified

atheroma.  There is no periaortic adenopathy or mass.

There is minimal intraperitoneal and subcutaneous body fat  tissue planes.

The bowel and mesentery are grossly unremarkable.

Pelvis:

The appendix is not identified as a distinct structure, however there is no pericecal

inflammation or fluid collection.

There is no pelvic ascites or adenopathy.

The prostate appears enlarged.  The bladder is unremarkable.

There are no lytic, blastic or destructive skeletal changes.  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



IMPRESSION:

The common biliary duct is dilated measuring up to 9 mm.

There is no intra hepatic biliary duct dilatation.

The pancreatic duct is not dilated.

There is no evidence of a pancreatic head mass.  The body and tail the pancreas are

unremarkable.  There is no pancreatic inflammation.

There is minimal body fat  tissue planes.



There is a small right pleural effusion.

There is no abdominal or pelvic ascites.



If felt clinically indicated consider follow-up hepatic MRI, biliary MRI (MRCP) and

pancreatic MRI.





<Electronically signed by Augustin Chamberlain > 04/08/21 [id number]

## 2021-05-26 ENCOUNTER — HOSPITAL ENCOUNTER (EMERGENCY)
Dept: HOSPITAL 53 - M ED | Age: 81
Discharge: HOME | End: 2021-05-26
Payer: MEDICARE

## 2021-05-26 VITALS — SYSTOLIC BLOOD PRESSURE: 151 MMHG | DIASTOLIC BLOOD PRESSURE: 70 MMHG

## 2021-05-26 VITALS — BODY MASS INDEX: 21.94 KG/M2 | WEIGHT: 139.77 LBS | HEIGHT: 67 IN

## 2021-05-26 DIAGNOSIS — Z88.8: ICD-10-CM

## 2021-05-26 DIAGNOSIS — L29.9: Primary | ICD-10-CM

## 2021-05-26 DIAGNOSIS — E03.9: ICD-10-CM

## 2021-05-26 DIAGNOSIS — N18.30: ICD-10-CM

## 2021-05-26 DIAGNOSIS — Z79.890: ICD-10-CM

## 2021-05-26 DIAGNOSIS — I12.9: ICD-10-CM

## 2021-05-26 DIAGNOSIS — Z79.899: ICD-10-CM

## 2021-05-26 DIAGNOSIS — T78.40XA: ICD-10-CM

## 2021-05-26 DIAGNOSIS — R21: ICD-10-CM

## 2021-05-26 DIAGNOSIS — Z86.73: ICD-10-CM

## 2021-05-26 LAB
BASOPHILS # BLD AUTO: 0 10^3/UL (ref 0–0.2)
BASOPHILS NFR BLD AUTO: 0.2 % (ref 0–1)
BUN SERPL-MCNC: 28 MG/DL (ref 7–18)
CALCIUM SERPL-MCNC: 9.9 MG/DL (ref 8.8–10.2)
CHLORIDE SERPL-SCNC: 106 MEQ/L (ref 98–107)
CO2 SERPL-SCNC: 28 MEQ/L (ref 21–32)
CREAT SERPL-MCNC: 0.98 MG/DL (ref 0.7–1.3)
CRP SERPL-MCNC: 0.62 MG/DL (ref 0–0.3)
EOSINOPHIL # BLD AUTO: 0.6 10^3/UL (ref 0–0.5)
EOSINOPHIL NFR BLD AUTO: 10.9 % (ref 0–3)
ERYTHROCYTE [SEDIMENTATION RATE] IN BLOOD BY WESTERGREN METHOD: 6 MM/HR (ref 0–20)
GFR SERPL CREATININE-BSD FRML MDRD: > 60 ML/MIN/{1.73_M2} (ref 35–?)
GLUCOSE SERPL-MCNC: 93 MG/DL (ref 70–100)
HCT VFR BLD AUTO: 43.6 % (ref 42–52)
HGB BLD-MCNC: 14.7 G/DL (ref 13.5–17.5)
LYMPHOCYTES # BLD AUTO: 0.6 10^3/UL (ref 1.5–5)
LYMPHOCYTES NFR BLD AUTO: 10.9 % (ref 24–44)
MCH RBC QN AUTO: 33.6 PG (ref 27–33)
MCHC RBC AUTO-ENTMCNC: 33.7 G/DL (ref 32–36.5)
MCV RBC AUTO: 99.8 FL (ref 80–96)
MONOCYTES # BLD AUTO: 0.4 10^3/UL (ref 0–0.8)
MONOCYTES NFR BLD AUTO: 6 % (ref 2–8)
NEUTROPHILS # BLD AUTO: 4.2 10^3/UL (ref 1.5–8.5)
NEUTROPHILS NFR BLD AUTO: 71.8 % (ref 36–66)
PLATELET # BLD AUTO: 111 10^3/UL (ref 150–450)
POTASSIUM SERPL-SCNC: 4.2 MEQ/L (ref 3.5–5.1)
RBC # BLD AUTO: 4.37 10^6/UL (ref 4.3–6.1)
SODIUM SERPL-SCNC: 138 MEQ/L (ref 136–145)
WBC # BLD AUTO: 5.9 10^3/UL (ref 4–10)

## 2021-05-26 PROCEDURE — 80048 BASIC METABOLIC PNL TOTAL CA: CPT

## 2021-05-26 PROCEDURE — 99283 EMERGENCY DEPT VISIT LOW MDM: CPT

## 2021-05-26 PROCEDURE — 86140 C-REACTIVE PROTEIN: CPT

## 2021-05-26 PROCEDURE — 96374 THER/PROPH/DIAG INJ IV PUSH: CPT

## 2021-05-26 PROCEDURE — 85025 COMPLETE CBC W/AUTO DIFF WBC: CPT

## 2021-05-26 PROCEDURE — 85652 RBC SED RATE AUTOMATED: CPT

## 2021-06-29 ENCOUNTER — HOSPITAL ENCOUNTER (OUTPATIENT)
Dept: HOSPITAL 53 - M RAD | Age: 81
End: 2021-06-29
Attending: INTERNAL MEDICINE
Payer: MEDICARE

## 2021-06-29 DIAGNOSIS — J98.4: ICD-10-CM

## 2021-06-29 DIAGNOSIS — J90: ICD-10-CM

## 2021-06-29 DIAGNOSIS — R59.0: ICD-10-CM

## 2021-06-29 DIAGNOSIS — J47.9: ICD-10-CM

## 2021-06-29 DIAGNOSIS — R91.8: Primary | ICD-10-CM

## 2021-06-29 NOTE — REP
INDICATION:

ABNORMAL FINDING OF LUNG FIELD



COMPARISON:

None.



TECHNIQUE:

Standard helical technique without intravenous contrast administration



FINDINGS:

There is mediastinal adenopathy.  There is a small to moderate right pleural effusion.

There is no pericardial effusion.  The imaged upper abdomen appears unchanged from

prior examination of 04/08/2021 the imaged osseous structures are within normal limits

for the patient's age.



Evaluation of the lung fields shows biapical pleuroparenchymal scarring.  Multiple

areas of pleural plaquing or identified some of which have calcifications.  There are

numerous 2 and 3 mm sized pulmonary nodules and in a reticulonodular type fashion.

There is mild cylindrical bronchiectasis.



IMPRESSION:

1. Chronic appearing parenchymal lung field changes as described above.  There is no

revised Fleischner society criteria on the recommendation for follow-up of such

findings.  Follow-up should be based on clinical assessment.

2. Biapical pleuroparenchymal scarring with no priors comparison.  Consider short

interval 3 month follow-up.

3. Small to moderate right pleural effusion a portion of which was seen on prior lung

base images obtained during abdominal and pelvic CT scanning of 04/08/2021.  A chronic

effusion raises concern for malignancy.

4. There is mediastinal adenopathy.

5. Multiple areas of pleural plaquing as described above if clinically relevant

follow-up with PET-CT.





<Electronically signed by Cole Ferrer > 06/29/21 [id number]

## 2021-10-11 ENCOUNTER — HOSPITAL ENCOUNTER (OUTPATIENT)
Dept: HOSPITAL 53 - M RAD | Age: 81
End: 2021-10-11
Attending: INTERNAL MEDICINE
Payer: MEDICARE

## 2021-10-11 DIAGNOSIS — J98.11: ICD-10-CM

## 2021-10-11 DIAGNOSIS — J90: ICD-10-CM

## 2021-10-11 DIAGNOSIS — R91.8: Primary | ICD-10-CM

## 2021-10-11 DIAGNOSIS — J84.9: ICD-10-CM

## 2021-10-11 DIAGNOSIS — J43.9: ICD-10-CM

## 2021-10-11 DIAGNOSIS — R59.0: ICD-10-CM

## 2021-10-21 NOTE — REP
INDICATION:

ABN FINDING OF LUNG



COMPARISON:

06/29/2021



TECHNIQUE:

Axial noncontrast images from the thoracic inlet to the upper abdomen with coronal and

sagittal reformations.



This CT examination was performed using the following dose reduction techniques:

Automated exposure control, adjustment of mA and/or kv according to the patient's

size, and use of iterative reconstruction technique.



FINDINGS:

Chronic emphysematous and interstitial changes along with chronic biapical scarring

again noted and unchanged.



Small right pleural effusion and associated right basilar atelectasis minimally

improved.



Bilateral pleural plaques along with few prominent mediastinal lymph nodes are again

noted and unchanged.



No new acute pulmonary parenchymal consolidation, nodule or mass lesion identified.

Tracheobronchial tree is patent.  Atherosclerotic changes to the thoracic aorta and

coronary arteries again noted and unchanged.  No cardiomegaly or pericardial effusion.

Surrounding musculoskeletal structures are intact.



IMPRESSION:

1. Small right pleural effusion along with mild adenopathy and scattered pleural

plaques again noted.  An acute pathologic process cannot be excluded and warrants

further investigation.  Consider thoracentesis and/or PET-CT.





<Electronically signed by Agapito Castellon > 10/21/21 [id number]

## 2021-11-18 ENCOUNTER — HOSPITAL ENCOUNTER (OUTPATIENT)
Dept: HOSPITAL 53 - M LAB REF | Age: 81
End: 2021-11-18
Attending: NURSE PRACTITIONER
Payer: MEDICARE

## 2021-11-18 DIAGNOSIS — N18.31: Primary | ICD-10-CM

## 2021-11-30 ENCOUNTER — HOSPITAL ENCOUNTER (OUTPATIENT)
Dept: HOSPITAL 53 - M LAB REF | Age: 81
End: 2021-11-30
Attending: NURSE PRACTITIONER
Payer: MEDICARE

## 2021-11-30 DIAGNOSIS — I50.9: Primary | ICD-10-CM

## 2022-10-13 ENCOUNTER — HOSPITAL ENCOUNTER (OUTPATIENT)
Dept: HOSPITAL 53 - M PLAIMG | Age: 82
End: 2022-10-13
Attending: INTERNAL MEDICINE
Payer: MEDICARE

## 2022-10-13 DIAGNOSIS — J90: ICD-10-CM

## 2022-10-13 DIAGNOSIS — R91.8: Primary | ICD-10-CM

## 2022-10-13 DIAGNOSIS — I25.10: ICD-10-CM

## 2023-02-17 ENCOUNTER — HOSPITAL ENCOUNTER (OUTPATIENT)
Dept: HOSPITAL 53 - M LAB REF | Age: 83
End: 2023-02-17
Attending: NURSE PRACTITIONER
Payer: MEDICARE

## 2023-02-17 DIAGNOSIS — E83.52: Primary | ICD-10-CM

## 2023-10-16 ENCOUNTER — HOSPITAL ENCOUNTER (OUTPATIENT)
Dept: HOSPITAL 53 - M RAD | Age: 83
End: 2023-10-16
Attending: INTERNAL MEDICINE
Payer: MEDICARE

## 2023-10-16 DIAGNOSIS — R91.8: Primary | ICD-10-CM

## 2023-10-16 DIAGNOSIS — J90: ICD-10-CM

## 2024-05-08 ENCOUNTER — HOSPITAL ENCOUNTER (OUTPATIENT)
Dept: HOSPITAL 53 - M PLAIMG | Age: 84
End: 2024-05-08
Attending: INTERNAL MEDICINE
Payer: MEDICARE

## 2024-05-08 DIAGNOSIS — J90: ICD-10-CM

## 2024-05-08 DIAGNOSIS — R91.8: Primary | ICD-10-CM

## 2024-05-08 DIAGNOSIS — J98.11: ICD-10-CM

## 2024-09-18 ENCOUNTER — HOSPITAL ENCOUNTER (OUTPATIENT)
Dept: HOSPITAL 53 - M PLAIMG | Age: 84
End: 2024-09-18
Attending: STUDENT IN AN ORGANIZED HEALTH CARE EDUCATION/TRAINING PROGRAM
Payer: MEDICARE

## 2024-09-18 DIAGNOSIS — R31.9: Primary | ICD-10-CM

## 2024-09-18 DIAGNOSIS — J90: ICD-10-CM

## 2024-09-18 DIAGNOSIS — R93.89: ICD-10-CM

## 2024-11-06 ENCOUNTER — HOSPITAL ENCOUNTER (INPATIENT)
Dept: HOSPITAL 53 - M ED | Age: 84
LOS: 9 days | DRG: 100 | End: 2024-11-15
Attending: STUDENT IN AN ORGANIZED HEALTH CARE EDUCATION/TRAINING PROGRAM | Admitting: HOSPITALIST
Payer: MEDICARE

## 2024-11-06 VITALS — SYSTOLIC BLOOD PRESSURE: 172 MMHG | DIASTOLIC BLOOD PRESSURE: 98 MMHG

## 2024-11-06 VITALS — SYSTOLIC BLOOD PRESSURE: 176 MMHG | DIASTOLIC BLOOD PRESSURE: 91 MMHG

## 2024-11-06 VITALS — TEMPERATURE: 99.4 F

## 2024-11-06 DIAGNOSIS — R40.2A: ICD-10-CM

## 2024-11-06 DIAGNOSIS — Z95.5: ICD-10-CM

## 2024-11-06 DIAGNOSIS — Z79.899: ICD-10-CM

## 2024-11-06 DIAGNOSIS — G40.909: Primary | ICD-10-CM

## 2024-11-06 DIAGNOSIS — J18.9: ICD-10-CM

## 2024-11-06 DIAGNOSIS — Z66: ICD-10-CM

## 2024-11-06 DIAGNOSIS — I25.10: ICD-10-CM

## 2024-11-06 DIAGNOSIS — Z88.6: ICD-10-CM

## 2024-11-06 DIAGNOSIS — I63.9: ICD-10-CM

## 2024-11-06 DIAGNOSIS — Z77.090: ICD-10-CM

## 2024-11-06 DIAGNOSIS — J96.00: ICD-10-CM

## 2024-11-06 DIAGNOSIS — Z87.891: ICD-10-CM

## 2024-11-06 DIAGNOSIS — I48.91: ICD-10-CM

## 2024-11-06 DIAGNOSIS — Z79.01: ICD-10-CM

## 2024-11-06 DIAGNOSIS — J90: ICD-10-CM

## 2024-11-06 DIAGNOSIS — E03.9: ICD-10-CM

## 2024-11-06 DIAGNOSIS — Z79.890: ICD-10-CM

## 2024-11-06 DIAGNOSIS — Z86.73: ICD-10-CM

## 2024-11-06 DIAGNOSIS — R41.4: ICD-10-CM

## 2024-11-06 LAB
ALBUMIN SERPL BCG-MCNC: 3.2 G/DL (ref 3.2–5.2)
ALP SERPL-CCNC: 88 U/L (ref 40–129)
ALT SERPL W P-5'-P-CCNC: 25 U/L (ref 7–40)
AMPHETAMINES UR QL SCN: NEGATIVE
APTT BLD: 31.2 SECONDS (ref 24.8–34.2)
AST SERPL-CCNC: 43 U/L (ref ?–34)
BARBITURATES UR QL SCN: NEGATIVE
BASE EXCESS BLDA CALC-SCNC: -16.6 MMOL/L (ref -2–2)
BASOPHILS # BLD AUTO: 0 10^3/UL (ref 0–0.2)
BASOPHILS NFR BLD AUTO: 0.4 % (ref 0–1)
BENZODIAZ UR QL SCN: NEGATIVE
BILIRUB CONJ SERPL-MCNC: 0.5 MG/DL (ref ?–0.4)
BILIRUB SERPL-MCNC: 1.3 MG/DL (ref 0.3–1.2)
BUN SERPL-MCNC: 27 MG/DL (ref 9–23)
BZE UR QL SCN: NEGATIVE
CALCIUM SERPL-MCNC: 9.6 MG/DL (ref 8.3–10.6)
CANNABINOIDS UR QL SCN: NEGATIVE
CHLORIDE SERPL-SCNC: 104 MMOL/L (ref 98–107)
CK MB CFR.DF SERPL CALC: 3.56
CK MB CFR.DF SERPL CALC: 4.28
CK MB SERPL-MCNC: 5.1 NG/ML (ref ?–3.6)
CK MB SERPL-MCNC: 6 NG/ML (ref ?–3.6)
CK SERPL-CCNC: 140 U/L (ref 46–171)
CK SERPL-CCNC: 143 U/L (ref 46–171)
CO2 BLDA CALC-SCNC: 10.6 MMOL/L (ref 23–31)
CO2 SERPL-SCNC: 19 MMOL/L (ref 20–31)
CREAT SERPL-MCNC: 1.25 MG/DL (ref 0.7–1.3)
EOSINOPHIL # BLD AUTO: 0 10^3/UL (ref 0–0.5)
EOSINOPHIL NFR BLD AUTO: 0.4 % (ref 0–3)
GFR SERPL CREATININE-BSD FRML MDRD: 58.6 ML/MIN/{1.73_M2} (ref 35–?)
GLUCOSE SERPL-MCNC: 134 MG/DL (ref 74–106)
HCO3 BLDA-SCNC: 9.8 MMOL/L (ref 22–26)
HCO3 STD BLDA-SCNC: 12.4 MMOL/L. (ref 22–26)
HCT VFR BLD AUTO: 44.3 % (ref 42–52)
HGB BLD-MCNC: 15 G/DL (ref 13.5–17.5)
INR PPP: 1.16
LYMPHOCYTES # BLD AUTO: 0.4 10^3/UL (ref 1.5–5)
LYMPHOCYTES NFR BLD AUTO: 8 % (ref 24–44)
MAGNESIUM SERPL-MCNC: 2 MG/DL (ref 1.8–2.4)
MCH RBC QN AUTO: 31.5 PG (ref 27–33)
MCHC RBC AUTO-ENTMCNC: 33.9 G/DL (ref 32–36.5)
MCV RBC AUTO: 93.1 FL (ref 80–96)
METHADONE UR QL SCN: NEGATIVE
MONOCYTES # BLD AUTO: 0.3 10^3/UL (ref 0–0.8)
MONOCYTES NFR BLD AUTO: 6.7 % (ref 2–8)
NEUTROPHILS # BLD AUTO: 4 10^3/UL (ref 1.5–8.5)
NEUTROPHILS NFR BLD AUTO: 83.7 % (ref 36–66)
OPIATES UR QL SCN: NEGATIVE
PCO2 BLDA: 26 MMHG (ref 35–45)
PCP UR QL SCN: NEGATIVE
PH BLDA: 7.19 UNITS (ref 7.35–7.45)
PLATELET # BLD AUTO: 103 10^3/UL (ref 150–450)
PO2 BLDA: 130.1 MMHG (ref 75–100)
POTASSIUM SERPL-SCNC: 4.8 MMOL/L (ref 3.5–5.1)
PROT SERPL-MCNC: 7 G/DL (ref 5.7–8.2)
PROTHROMBIN TIME: 15.1 SECONDS (ref 12.5–14.5)
RBC # BLD AUTO: 4.76 10^6/UL (ref 4.3–6.1)
SAO2 % BLDA: 97.9 % (ref 95–99)
SODIUM SERPL-SCNC: 137 MMOL/L (ref 136–145)
T4 FREE SERPL-MCNC: 1.86 NG/DL (ref 0.89–1.76)
TSH SERPL DL<=0.005 MIU/L-ACNC: 3.03 UIU/ML (ref 0.55–4.78)
WBC # BLD AUTO: 4.8 10^3/UL (ref 4–10)

## 2024-11-06 RX ADMIN — LEVOFLOXACIN ONE MLS/HR: 5 INJECTION, SOLUTION INTRAVENOUS at 16:37

## 2024-11-06 RX ADMIN — FUROSEMIDE ONE MG: 10 INJECTION, SOLUTION INTRAMUSCULAR; INTRAVENOUS at 16:37

## 2024-11-06 RX ADMIN — LORAZEPAM PRN MG: 2 INJECTION INTRAMUSCULAR; INTRAVENOUS at 18:18

## 2024-11-06 RX ADMIN — LEVETIRACETAM ONE MLS/HR: 500 INJECTION, SOLUTION, CONCENTRATE INTRAVENOUS at 15:21

## 2024-11-06 RX ADMIN — MORPHINE SULFATE PRN MG: 10 SOLUTION ORAL at 18:41

## 2024-11-06 RX ADMIN — METOPROLOL TARTRATE SCH MG: 5 INJECTION INTRAVENOUS at 16:10

## 2024-11-06 RX ADMIN — SCOPALAMINE PRN MG: 1 PATCH, EXTENDED RELEASE TRANSDERMAL at 21:32

## 2024-11-06 RX ADMIN — MORPHINE SULFATE PRN MG: 10 SOLUTION ORAL at 20:08

## 2024-11-06 RX ADMIN — LORAZEPAM PRN MG: 2 INJECTION INTRAMUSCULAR; INTRAVENOUS at 20:07

## 2024-11-07 RX ADMIN — MORPHINE SULFATE PRN MG: 10 SOLUTION ORAL at 00:41

## 2024-11-08 VITALS — OXYGEN SATURATION: 95 %

## 2024-11-13 RX ADMIN — ATROPINE SULFATE PRN DROP: 10 SOLUTION/ DROPS OPHTHALMIC at 11:10

## 2024-11-13 RX ADMIN — HYOSCYAMINE SULFATE PRN MG: 0.12 TABLET SUBLINGUAL at 12:38

## 2024-11-13 RX ADMIN — MORPHINE SULFATE PRN MG: 10 SOLUTION ORAL at 10:22
